# Patient Record
Sex: FEMALE | Race: WHITE | NOT HISPANIC OR LATINO | Employment: OTHER | URBAN - METROPOLITAN AREA
[De-identification: names, ages, dates, MRNs, and addresses within clinical notes are randomized per-mention and may not be internally consistent; named-entity substitution may affect disease eponyms.]

---

## 2017-09-20 ENCOUNTER — ALLSCRIPTS OFFICE VISIT (OUTPATIENT)
Dept: OTHER | Facility: OTHER | Age: 67
End: 2017-09-20

## 2018-01-13 VITALS
WEIGHT: 155.13 LBS | HEART RATE: 70 BPM | DIASTOLIC BLOOD PRESSURE: 66 MMHG | SYSTOLIC BLOOD PRESSURE: 102 MMHG | HEIGHT: 63 IN | BODY MASS INDEX: 27.49 KG/M2

## 2018-03-20 DIAGNOSIS — E04.1 NONTOXIC SINGLE THYROID NODULE: ICD-10-CM

## 2018-03-20 DIAGNOSIS — E55.9 VITAMIN D DEFICIENCY: ICD-10-CM

## 2018-03-26 ENCOUNTER — TELEPHONE (OUTPATIENT)
Dept: ENDOCRINOLOGY | Facility: CLINIC | Age: 68
End: 2018-03-26

## 2018-03-26 NOTE — TELEPHONE ENCOUNTER
Thyroid ultrasound reviewed from [facility] imaging which was done on March 23, 2018  No comparison done to previous ultrasound  Multinodular goiter with dominant tired category 4 nodule in lower pole of left lobe which meets 3 showed for fine-needle aspiration based on is here guidelines  Smaller category 4 nodule in superior pole of left lobe which does not mid pressure for fine-needle aspiration  Solid heterogeneously hypoechoic 2 1 x 3 8 x 2 4 cm category 4 nodule with ill-defined margins is seen in lower pole of left lobe  This nodule was previously biopsied and biopsy was not benign    September 2017 ultrasound showed left lower lobe nodule was3 7 x 2 4 x 2 4 cm    Please inform patient that, on thyroid nodules are stable, will continue to monitor with thyroid ultrasound every year    She should call us if she has difficulty swallowing     Leigh Reveles MD

## 2018-03-27 ENCOUNTER — TELEPHONE (OUTPATIENT)
Dept: ENDOCRINOLOGY | Facility: CLINIC | Age: 68
End: 2018-03-27

## 2018-03-27 NOTE — TELEPHONE ENCOUNTER
Labs from [facility] dated 3/23/18 received and placed on provider's desk for review

## 2018-03-27 NOTE — TELEPHONE ENCOUNTER
Sent thyroid US disc to [facility] for them to enter into [facility] and then return disc to our office

## 2018-11-27 ENCOUNTER — TELEPHONE (OUTPATIENT)
Dept: ENDOCRINOLOGY | Facility: CLINIC | Age: 68
End: 2018-11-27

## 2018-11-27 NOTE — TELEPHONE ENCOUNTER
Patient called to request records to be sent to office in [place], [place]  Emailed patient release of health information to be signed and sent back in order to send records

## 2019-02-27 ENCOUNTER — TELEPHONE (OUTPATIENT)
Dept: ENDOCRINOLOGY | Facility: CLINIC | Age: 69
End: 2019-02-27

## 2019-02-27 NOTE — TELEPHONE ENCOUNTER
Pt called for release of records to be mailed to her  She is now in [facility]  Mailed her one back in November but patient said she never received it   Mailing again to patients address in [facility]

## 2019-03-05 NOTE — TELEPHONE ENCOUNTER
Received records released from pt, pt was only seen once in the office faxed over records to [facility]